# Patient Record
Sex: MALE | Race: WHITE | NOT HISPANIC OR LATINO | Employment: PART TIME | URBAN - METROPOLITAN AREA
[De-identification: names, ages, dates, MRNs, and addresses within clinical notes are randomized per-mention and may not be internally consistent; named-entity substitution may affect disease eponyms.]

---

## 2017-01-12 ENCOUNTER — AMBULATORY - HEALTHEAST (OUTPATIENT)
Dept: CARDIOLOGY | Facility: CLINIC | Age: 57
End: 2017-01-12

## 2017-01-12 DIAGNOSIS — R94.31 PROLONGED QT INTERVAL: ICD-10-CM

## 2017-01-13 ENCOUNTER — HOSPITAL ENCOUNTER (OUTPATIENT)
Dept: CARDIOLOGY | Facility: CLINIC | Age: 57
Discharge: HOME | End: 2017-01-13

## 2017-01-13 DIAGNOSIS — R94.31 PROLONGED QT INTERVAL: ICD-10-CM

## 2017-01-13 LAB
ATRIAL RATE - MUSE: 67 BPM
DIASTOLIC BLOOD PRESSURE - MUSE: NORMAL MMHG
INTERPRETATION ECG - MUSE: NORMAL
P AXIS - MUSE: 49 DEGREES
PR INTERVAL - MUSE: 130 MS
QRS DURATION - MUSE: 80 MS
QT - MUSE: 408 MS
QTC - MUSE: 431 MS
R AXIS - MUSE: 9 DEGREES
SYSTOLIC BLOOD PRESSURE - MUSE: NORMAL MMHG
T AXIS - MUSE: 56 DEGREES
VENTRICULAR RATE- MUSE: 67 BPM

## 2017-01-23 ENCOUNTER — AMBULATORY - HEALTHEAST (OUTPATIENT)
Dept: LAB | Facility: CLINIC | Age: 57
End: 2017-01-23

## 2017-01-23 DIAGNOSIS — Z79.899 ENCOUNTER FOR LONG-TERM (CURRENT) USE OF OTHER MEDICATIONS: ICD-10-CM

## 2017-01-23 LAB
CHOLEST SERPL-MCNC: 179 MG/DL
FASTING STATUS PATIENT QL REPORTED: ABNORMAL
HBA1C MFR BLD: 5.1 % (ref 4.2–6.1)
HDLC SERPL-MCNC: 41 MG/DL
LDLC SERPL CALC-MCNC: 69 MG/DL
TRIGL SERPL-MCNC: 346 MG/DL

## 2017-01-27 LAB
MISCELLANEOUS TEST DEPT. - HE HISTORICAL: NORMAL
PERFORMING LAB: NORMAL
SPECIMEN STATUS: NORMAL
TEST NAME: NORMAL

## 2018-01-11 ENCOUNTER — RECORDS - HEALTHEAST (OUTPATIENT)
Dept: LAB | Facility: CLINIC | Age: 58
End: 2018-01-11

## 2018-01-11 LAB
CHOLEST SERPL-MCNC: 174 MG/DL
FASTING STATUS PATIENT QL REPORTED: ABNORMAL
HDLC SERPL-MCNC: 50 MG/DL
LDLC SERPL CALC-MCNC: 85 MG/DL
TRIGL SERPL-MCNC: 194 MG/DL

## 2018-02-20 ENCOUNTER — AMBULATORY - HEALTHEAST (OUTPATIENT)
Dept: CARDIOLOGY | Facility: CLINIC | Age: 58
End: 2018-02-20

## 2018-02-20 ENCOUNTER — RECORDS - HEALTHEAST (OUTPATIENT)
Dept: ADMINISTRATIVE | Facility: OTHER | Age: 58
End: 2018-02-20

## 2018-02-21 ENCOUNTER — OFFICE VISIT - HEALTHEAST (OUTPATIENT)
Dept: CARDIOLOGY | Facility: CLINIC | Age: 58
End: 2018-02-21

## 2018-02-21 DIAGNOSIS — R07.9 CHEST PAIN, UNSPECIFIED TYPE: ICD-10-CM

## 2018-02-21 DIAGNOSIS — I10 ESSENTIAL HYPERTENSION: ICD-10-CM

## 2018-02-21 RX ORDER — DIPHENHYDRAMINE HCL 25 MG
CAPSULE ORAL
Status: SHIPPED | COMMUNITY
Start: 2016-07-07 | End: 2024-02-02

## 2018-02-21 RX ORDER — PILOCARPINE HYDROCHLORIDE 5 MG/1
TABLET, FILM COATED ORAL
Refills: 0 | Status: SHIPPED | COMMUNITY
Start: 2018-02-15 | End: 2024-02-02

## 2018-02-21 RX ORDER — CARIPRAZINE 3 MG/1
CAPSULE, GELATIN COATED ORAL
Refills: 6 | Status: SHIPPED | COMMUNITY
Start: 2018-01-09 | End: 2024-02-02

## 2018-02-21 RX ORDER — TRAZODONE HYDROCHLORIDE 100 MG/1
TABLET ORAL
Status: SHIPPED | COMMUNITY
Start: 2016-07-07 | End: 2024-02-02

## 2018-02-21 RX ORDER — BACLOFEN 20 MG/1
TABLET ORAL
Refills: 6 | Status: SHIPPED | COMMUNITY
Start: 2018-02-02

## 2018-02-21 RX ORDER — SILDENAFIL 25 MG/1
25 TABLET, FILM COATED ORAL DAILY PRN
Status: SHIPPED | COMMUNITY
Start: 2018-02-21

## 2018-02-21 RX ORDER — BUSPIRONE HYDROCHLORIDE 30 MG/1
TABLET ORAL
Status: SHIPPED | COMMUNITY
Start: 2018-02-21

## 2018-02-21 RX ORDER — LURASIDONE HYDROCHLORIDE 80 MG/1
TABLET, FILM COATED ORAL
Refills: 4 | Status: SHIPPED | COMMUNITY
Start: 2018-02-14 | End: 2024-02-02

## 2018-02-21 ASSESSMENT — MIFFLIN-ST. JEOR: SCORE: 1658.56

## 2018-04-13 ENCOUNTER — HOSPITAL ENCOUNTER (OUTPATIENT)
Dept: CARDIOLOGY | Facility: CLINIC | Age: 58
Discharge: HOME | End: 2018-04-13
Attending: INTERNAL MEDICINE

## 2018-04-13 LAB
CV STRESS CURRENT BP HE: NORMAL
CV STRESS CURRENT HR HE: 102
CV STRESS CURRENT HR HE: 104
CV STRESS CURRENT HR HE: 109
CV STRESS CURRENT HR HE: 113
CV STRESS CURRENT HR HE: 121
CV STRESS CURRENT HR HE: 121
CV STRESS CURRENT HR HE: 56
CV STRESS CURRENT HR HE: 60
CV STRESS CURRENT HR HE: 60
CV STRESS CURRENT HR HE: 61
CV STRESS CURRENT HR HE: 63
CV STRESS CURRENT HR HE: 66
CV STRESS CURRENT HR HE: 68
CV STRESS CURRENT HR HE: 69
CV STRESS CURRENT HR HE: 70
CV STRESS CURRENT HR HE: 74
CV STRESS CURRENT HR HE: 81
CV STRESS CURRENT HR HE: 88
CV STRESS CURRENT HR HE: 91
CV STRESS CURRENT HR HE: 92
CV STRESS CURRENT HR HE: 93
CV STRESS CURRENT HR HE: 94
CV STRESS CURRENT HR HE: 95
CV STRESS CURRENT HR HE: 97
CV STRESS CURRENT HR HE: 98
CV STRESS DEVIATION TIME HE: NORMAL
CV STRESS ECHO PERCENT HR HE: NORMAL
CV STRESS EXERCISE STAGE HE: NORMAL
CV STRESS FINAL RESTING BP HE: NORMAL
CV STRESS FINAL RESTING HR HE: 70
CV STRESS MAX HR HE: 122
CV STRESS MAX TREADMILL GRADE HE: 16
CV STRESS MAX TREADMILL SPEED HE: 4.2
CV STRESS PEAK DIA BP HE: NORMAL
CV STRESS PEAK SYS BP HE: NORMAL
CV STRESS PHASE HE: NORMAL
CV STRESS PROTOCOL HE: NORMAL
CV STRESS RESTING PT POSITION HE: NORMAL
CV STRESS ST DEVIATION AMOUNT HE: NORMAL
CV STRESS ST DEVIATION ELEVATION HE: NORMAL
CV STRESS ST EVELATION AMOUNT HE: NORMAL
CV STRESS TEST TYPE HE: NORMAL
CV STRESS TOTAL STAGE TIME MIN 1 HE: NORMAL
ECHO EJECTION FRACTION ESTIMATED: 60 %
STRESS ECHO BASELINE BP: NORMAL
STRESS ECHO BASELINE HR: 75
STRESS ECHO CALCULATED PERCENT HR: 76 %
STRESS ECHO LAST STRESS BP: NORMAL
STRESS ECHO LAST STRESS HR: 121
STRESS ECHO POST ESTIMATED WORKLOAD: 11.7
STRESS ECHO POST EXERCISE DUR MIN: 10
STRESS ECHO POST EXERCISE DUR SEC: 0
STRESS ECHO TARGET HR: 137

## 2018-05-31 ENCOUNTER — OFFICE VISIT - HEALTHEAST (OUTPATIENT)
Dept: INTERNAL MEDICINE | Facility: CLINIC | Age: 58
End: 2018-05-31

## 2018-05-31 DIAGNOSIS — G56.32 RADIAL NERVE PALSY, LEFT: ICD-10-CM

## 2018-05-31 ASSESSMENT — MIFFLIN-ST. JEOR: SCORE: 1672.62

## 2018-06-14 ENCOUNTER — COMMUNICATION - HEALTHEAST (OUTPATIENT)
Dept: INTERNAL MEDICINE | Facility: CLINIC | Age: 58
End: 2018-06-14

## 2018-06-14 ENCOUNTER — OFFICE VISIT - HEALTHEAST (OUTPATIENT)
Dept: INTERNAL MEDICINE | Facility: CLINIC | Age: 58
End: 2018-06-14

## 2018-06-14 DIAGNOSIS — F10.21 SEVERE ALCOHOL USE DISORDER, IN SUSTAINED REMISSION (H): ICD-10-CM

## 2018-06-14 DIAGNOSIS — I10 ESSENTIAL HYPERTENSION: ICD-10-CM

## 2018-06-14 DIAGNOSIS — Z12.11 SCREEN FOR COLON CANCER: ICD-10-CM

## 2018-06-14 DIAGNOSIS — K21.9 GASTROESOPHAGEAL REFLUX DISEASE WITHOUT ESOPHAGITIS: ICD-10-CM

## 2018-06-14 DIAGNOSIS — F25.1 SCHIZOAFFECTIVE DISORDER, DEPRESSIVE TYPE (H): ICD-10-CM

## 2018-06-14 DIAGNOSIS — B18.2 CHRONIC HEPATITIS C WITHOUT HEPATIC COMA (H): ICD-10-CM

## 2018-06-14 DIAGNOSIS — R29.90 STROKE-LIKE SYMPTOMS: ICD-10-CM

## 2018-06-14 DIAGNOSIS — R29.898 LEFT HAND WEAKNESS: ICD-10-CM

## 2018-06-14 ASSESSMENT — MIFFLIN-ST. JEOR: SCORE: 1659.01

## 2018-06-25 ENCOUNTER — HOSPITAL ENCOUNTER (OUTPATIENT)
Dept: MRI IMAGING | Facility: CLINIC | Age: 58
Discharge: HOME | End: 2018-06-25
Attending: INTERNAL MEDICINE

## 2018-06-25 DIAGNOSIS — R29.898 LEFT HAND WEAKNESS: ICD-10-CM

## 2018-06-25 DIAGNOSIS — R42 DIZZINESS AND GIDDINESS: ICD-10-CM

## 2018-06-25 DIAGNOSIS — R29.90 STROKE-LIKE SYMPTOMS: ICD-10-CM

## 2018-06-28 ENCOUNTER — OFFICE VISIT - HEALTHEAST (OUTPATIENT)
Dept: OCCUPATIONAL THERAPY | Facility: REHABILITATION | Age: 58
End: 2018-06-28

## 2018-06-28 DIAGNOSIS — R29.898 WRIST WEAKNESS: ICD-10-CM

## 2018-06-28 DIAGNOSIS — M25.639 DECREASED RANGE OF MOTION OF WRIST: ICD-10-CM

## 2018-06-28 DIAGNOSIS — Z78.9 DECREASED ACTIVITIES OF DAILY LIVING (ADL): ICD-10-CM

## 2018-07-05 ENCOUNTER — HOSPITAL ENCOUNTER (OUTPATIENT)
Dept: PHYSICAL MEDICINE AND REHAB | Facility: CLINIC | Age: 58
Discharge: HOME | End: 2018-07-05
Attending: INTERNAL MEDICINE

## 2018-07-05 DIAGNOSIS — R29.898 LEFT HAND WEAKNESS: ICD-10-CM

## 2018-07-09 ENCOUNTER — COMMUNICATION - HEALTHEAST (OUTPATIENT)
Dept: INTERNAL MEDICINE | Facility: CLINIC | Age: 58
End: 2018-07-09

## 2018-07-13 ENCOUNTER — OFFICE VISIT - HEALTHEAST (OUTPATIENT)
Dept: INTERNAL MEDICINE | Facility: CLINIC | Age: 58
End: 2018-07-13

## 2018-07-13 DIAGNOSIS — G62.9 NEUROPATHY: ICD-10-CM

## 2018-07-13 ASSESSMENT — MIFFLIN-ST. JEOR: SCORE: 1654.48

## 2018-07-17 ENCOUNTER — OFFICE VISIT - HEALTHEAST (OUTPATIENT)
Dept: OCCUPATIONAL THERAPY | Facility: REHABILITATION | Age: 58
End: 2018-07-17

## 2018-07-17 DIAGNOSIS — R29.898 WRIST WEAKNESS: ICD-10-CM

## 2018-07-17 DIAGNOSIS — M25.639 DECREASED RANGE OF MOTION OF WRIST: ICD-10-CM

## 2018-07-17 DIAGNOSIS — Z78.9 DECREASED ACTIVITIES OF DAILY LIVING (ADL): ICD-10-CM

## 2018-10-30 ENCOUNTER — RECORDS - HEALTHEAST (OUTPATIENT)
Dept: ADMINISTRATIVE | Facility: OTHER | Age: 58
End: 2018-10-30

## 2018-11-05 ENCOUNTER — HOSPITAL ENCOUNTER (OUTPATIENT)
Dept: MRI IMAGING | Facility: CLINIC | Age: 58
Discharge: HOME | End: 2018-11-05

## 2018-11-05 DIAGNOSIS — R29.898 LEFT HAND WEAKNESS: ICD-10-CM

## 2019-01-21 ENCOUNTER — RECORDS - HEALTHEAST (OUTPATIENT)
Dept: LAB | Facility: CLINIC | Age: 59
End: 2019-01-21

## 2019-01-21 LAB
ANION GAP SERPL CALCULATED.3IONS-SCNC: 11 MMOL/L (ref 5–18)
BUN SERPL-MCNC: 15 MG/DL (ref 8–22)
CALCIUM SERPL-MCNC: 9.8 MG/DL (ref 8.5–10.5)
CHLORIDE BLD-SCNC: 105 MMOL/L (ref 98–107)
CO2 SERPL-SCNC: 25 MMOL/L (ref 22–31)
CREAT SERPL-MCNC: 1.15 MG/DL (ref 0.7–1.3)
GFR SERPL CREATININE-BSD FRML MDRD: >60 ML/MIN/1.73M2
GLUCOSE BLD-MCNC: 141 MG/DL (ref 70–125)
POTASSIUM BLD-SCNC: 4.2 MMOL/L (ref 3.5–5)
SODIUM SERPL-SCNC: 141 MMOL/L (ref 136–145)

## 2019-03-28 ENCOUNTER — RECORDS - HEALTHEAST (OUTPATIENT)
Dept: ADMINISTRATIVE | Facility: OTHER | Age: 59
End: 2019-03-28

## 2019-04-01 ENCOUNTER — AMBULATORY - HEALTHEAST (OUTPATIENT)
Dept: CARDIOLOGY | Facility: CLINIC | Age: 59
End: 2019-04-01

## 2019-04-01 ENCOUNTER — HOSPITAL ENCOUNTER (OUTPATIENT)
Dept: CARDIOLOGY | Facility: CLINIC | Age: 59
Discharge: HOME | End: 2019-04-01

## 2019-04-01 DIAGNOSIS — Z79.899 MEDICATION MANAGEMENT: ICD-10-CM

## 2019-04-01 LAB
ATRIAL RATE - MUSE: 67 BPM
DIASTOLIC BLOOD PRESSURE - MUSE: NORMAL MMHG
INTERPRETATION ECG - MUSE: NORMAL
P AXIS - MUSE: 60 DEGREES
PR INTERVAL - MUSE: 140 MS
QRS DURATION - MUSE: 90 MS
QT - MUSE: 408 MS
QTC - MUSE: 431 MS
R AXIS - MUSE: 25 DEGREES
SYSTOLIC BLOOD PRESSURE - MUSE: NORMAL MMHG
T AXIS - MUSE: 62 DEGREES
VENTRICULAR RATE- MUSE: 67 BPM

## 2020-03-25 ENCOUNTER — RECORDS - HEALTHEAST (OUTPATIENT)
Dept: ADMINISTRATIVE | Facility: OTHER | Age: 60
End: 2020-03-25

## 2020-03-25 LAB
LAB AP CHARGES (HE HISTORICAL CONVERSION): NORMAL
PATH REPORT.COMMENTS IMP SPEC: NORMAL
PATH REPORT.COMMENTS IMP SPEC: NORMAL
PATH REPORT.FINAL DX SPEC: NORMAL
PATH REPORT.GROSS SPEC: NORMAL
PATH REPORT.MICROSCOPIC SPEC OTHER STN: NORMAL
PATH REPORT.RELEVANT HX SPEC: NORMAL
RESULT FLAG (HE HISTORICAL CONVERSION): NORMAL

## 2020-04-16 ENCOUNTER — AMBULATORY - HEALTHEAST (OUTPATIENT)
Dept: LAB | Facility: CLINIC | Age: 60
End: 2020-04-16

## 2020-04-16 DIAGNOSIS — Z79.899 ENCOUNTER FOR LONG-TERM (CURRENT) USE OF MEDICATIONS: ICD-10-CM

## 2020-04-16 LAB
CHOLEST SERPL-MCNC: 154 MG/DL
ERYTHROCYTE [DISTWIDTH] IN BLOOD BY AUTOMATED COUNT: 13.2 % (ref 11–14.5)
FASTING STATUS PATIENT QL REPORTED: ABNORMAL
FASTING STATUS PATIENT QL REPORTED: NORMAL
GLUCOSE BLD-MCNC: 96 MG/DL (ref 70–125)
HBA1C MFR BLD: 5.4 %
HCT VFR BLD AUTO: 43.7 % (ref 40–54)
HDLC SERPL-MCNC: 53 MG/DL
HGB BLD-MCNC: 13.9 G/DL (ref 14–18)
LDLC SERPL CALC-MCNC: 68 MG/DL
MCH RBC QN AUTO: 30.5 PG (ref 27–34)
MCHC RBC AUTO-ENTMCNC: 31.8 G/DL (ref 32–36)
MCV RBC AUTO: 96 FL (ref 80–100)
PLATELET # BLD AUTO: 282 THOU/UL (ref 140–440)
PMV BLD AUTO: 9.7 FL (ref 8.5–12.5)
RBC # BLD AUTO: 4.56 MILL/UL (ref 4.4–6.2)
TRIGL SERPL-MCNC: 163 MG/DL
WBC: 7.8 THOU/UL (ref 4–11)

## 2020-04-24 ENCOUNTER — AMBULATORY - HEALTHEAST (OUTPATIENT)
Dept: CARDIOLOGY | Facility: CLINIC | Age: 60
End: 2020-04-24

## 2020-04-24 DIAGNOSIS — T50.905A MEDICATION REACTION: ICD-10-CM

## 2020-07-15 ENCOUNTER — RECORDS - HEALTHEAST (OUTPATIENT)
Dept: LAB | Facility: CLINIC | Age: 60
End: 2020-07-15

## 2020-07-15 LAB
ANION GAP SERPL CALCULATED.3IONS-SCNC: 12 MMOL/L (ref 5–18)
BUN SERPL-MCNC: 12 MG/DL (ref 8–22)
CALCIUM SERPL-MCNC: 9.3 MG/DL (ref 8.5–10.5)
CHLORIDE BLD-SCNC: 105 MMOL/L (ref 98–107)
CO2 SERPL-SCNC: 23 MMOL/L (ref 22–31)
CREAT SERPL-MCNC: 1.17 MG/DL (ref 0.7–1.3)
GFR SERPL CREATININE-BSD FRML MDRD: >60 ML/MIN/1.73M2
GLUCOSE BLD-MCNC: 77 MG/DL (ref 70–125)
POTASSIUM BLD-SCNC: 4.4 MMOL/L (ref 3.5–5)
PSA SERPL-MCNC: 0.2 NG/ML (ref 0–4.5)
SODIUM SERPL-SCNC: 140 MMOL/L (ref 136–145)

## 2021-01-11 ENCOUNTER — RECORDS - HEALTHEAST (OUTPATIENT)
Dept: LAB | Facility: CLINIC | Age: 61
End: 2021-01-11

## 2021-01-11 LAB
ANION GAP SERPL CALCULATED.3IONS-SCNC: 11 MMOL/L (ref 5–18)
BUN SERPL-MCNC: 9 MG/DL (ref 8–22)
CALCIUM SERPL-MCNC: 9.8 MG/DL (ref 8.5–10.5)
CHLORIDE BLD-SCNC: 102 MMOL/L (ref 98–107)
CO2 SERPL-SCNC: 25 MMOL/L (ref 22–31)
CREAT SERPL-MCNC: 0.99 MG/DL (ref 0.7–1.3)
GFR SERPL CREATININE-BSD FRML MDRD: >60 ML/MIN/1.73M2
GLUCOSE BLD-MCNC: 109 MG/DL (ref 70–125)
POTASSIUM BLD-SCNC: 4.5 MMOL/L (ref 3.5–5)
SODIUM SERPL-SCNC: 138 MMOL/L (ref 136–145)

## 2021-06-01 VITALS — WEIGHT: 188 LBS | BODY MASS INDEX: 26.92 KG/M2 | HEIGHT: 70 IN

## 2021-06-01 VITALS — WEIGHT: 187.9 LBS | HEIGHT: 70 IN | BODY MASS INDEX: 26.9 KG/M2

## 2021-06-01 VITALS — HEIGHT: 70 IN | BODY MASS INDEX: 27.35 KG/M2 | WEIGHT: 191 LBS

## 2021-06-01 VITALS — BODY MASS INDEX: 26.77 KG/M2 | WEIGHT: 187 LBS | HEIGHT: 70 IN

## 2021-06-16 PROBLEM — F10.21 SEVERE ALCOHOL USE DISORDER, IN SUSTAINED REMISSION (H): Status: ACTIVE | Noted: 2018-06-14

## 2021-06-16 PROBLEM — K21.9 GASTROESOPHAGEAL REFLUX DISEASE WITHOUT ESOPHAGITIS: Status: ACTIVE | Noted: 2018-06-14

## 2021-06-16 PROBLEM — B18.2 CHRONIC HEPATITIS C VIRUS INFECTION (H): Status: ACTIVE | Noted: 2018-06-14

## 2021-06-18 NOTE — PROGRESS NOTES
Office Visit - New Patient   Patricio Khan   57 y.o.  male    Date of visit: 6/14/2018  Physician: Edmund Bowden MD     Assessment and Plan   1. Left hand weakness  This is probably a peripheral neuropathy but he has risk factors for vascular disease and given ongoing weakness I would recommend an MRI to evaluate for a central process.  He is on aspirin.  Further workup evaluation depending on findings of MRI.  Assuming this is normal we will proceed with EMG and physical therapy consider neurology evaluation  - MR Brain COW Carotid With and Without Contrast; Future  - EMG- Left Arm; Future  - Ambulatory referral to Adult PT- Internal    2. Schizoaffective disorder, depressive type (H)  Follows with Dr. Sunday Singh, generally stable    3. Essential hypertension  Pressure controlled he is on propanolol    4. Severe alcohol use disorder, in sustained remission (H)  He takes baclofen for this, sober for a couple of years    5. Gastroesophageal reflux disease without esophagitis  He is on a PPI    6. Chronic hepatitis C without hepatic coma (H)  Status post treatment    7. Screen for colon cancer  He states he is up-to-date on his colon cancer screening    Return in about 1 week (around 6/21/2018) for recheck.     Chief Complaint   Chief Complaint   Patient presents with     Wrist Pain     unable to move left wrist        Patient Profile   Social History     Social History Narrative    Lives with his , Kd.  On SSDI, mental illness.  Previously worked as a .  Kd is on disability secondary to a stroke.         Past Medical History   Patient Active Problem List   Diagnosis     Chronic pain syndrome     Anxiety     Schizoaffective disorder, depressive type (H)     Essential hypertension     Severe alcohol use disorder, in sustained remission (H)     Gastroesophageal reflux disease without esophagitis     Hep C, s/p tx       Past Surgical History  He has a past surgical history that  includes [place] tooth extraction.     History of Present Illness   This 57 y.o. old man comes in for evaluation of left hand weakness.  About 2 weeks ago he woke up and he noticed that his hand was weak.  He could not extend his wrist or  with his fist.  He thought he had some numbness as well but this seems to have resolved.  He did not notice any other neurological deficits.  Specifically no facial droop no left leg weakness.  No speech difficulties.  No vision changes.  He has no history of stroke or heart attack.  He does have a history of smoking and hypertension.  His lipids have generally been okay.  Recent stress echocardiogram looked okay for evaluation of chest pain.  He does take an aspirin but is not on a statin.  He was seen by Dr. 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  It was recommended that he give this time and it will improve.  Certainly it has improved since he saw Dr. Ley but he still has weakness with extension of his wrist as well as  strength.    Review of Systems: A comprehensive review of systems was negative except as noted.     Medications and Allergies   Current Outpatient Prescriptions   Medication Sig Dispense Refill     aspirin 81 MG EC tablet Take 81 mg by mouth daily.       baclofen (LIORESAL) 20 MG tablet TK 2 TS PO QAM AND QHS  6     busPIRone (BUSPAR) 30 MG tablet 1 tab(s)       dicyclomine (BENTYL) 20 mg tablet Take 20 mg by mouth 2 (two) times a day.       diphenhydrAMINE (BENADRYL) 25 mg capsule 2 cap(s)       LATUDA 80 mg Tab TK 2 TS PO QAM WITH MORNING MEAL OF AT LEAST 500 CALORIES  4     multivitamin with minerals (THERA-M) 9 mg iron-400 mcg Tab tablet Take 1 tablet by mouth daily.       omeprazole (PRILOSEC) 40 MG capsule Take 1 capsule (40 mg total) by mouth 2 (two) times a day before meals. 60 capsule 0     pilocarpine (SALAGEN) 5 MG tablet TK ONE T PO WITH EVENING MEAL AND ONE T FOUR  HOURS LATER AT BEDTIME  0     propranolol (INDERAL LA) 120  "mg 24 hr capsule TK ONE C D  3     psyllium with dextrose (METAMUCIL JAR) powder Take by mouth 3 (three) times a day.       sildenafil (VIAGRA) 25 MG tablet Take 25 mg by mouth daily as needed for erectile dysfunction.       simethicone (MYLICON) 125 MG chewable tablet Chew 125 mg daily.        traZODone (DESYREL) 100 MG tablet 1 1/2   tab(s)       VRAYLAR 3 mg cap capsule TK ONE C PO QAM  6     No current facility-administered medications for this visit.      Allergies   Allergen Reactions     Chlorpromazine Shortness Of Breath     Acetaminophen Nausea Only     Bupropion Hcl      Unable to urinate     Fluphenazine Hcl      Unable to urinate     Latuda [Lurasidone]      EPSE   Primary MD directed pt. to avoid      Mellaril [Thioridazine] Other (See Comments)     Difficulty breathing and Unable to urinate     Nsaids (Non-Steroidal Anti-Inflammatory Drug) Other (See Comments)     Primary md directed patient to avoid     Olanzapine      Unable to urinate     Risperidone      Unable to urinate     Statins-Hmg-Coa Reductase Inhibitors      unknown        Family and Social History   Family History   Problem Relation Age of Onset     No Medical Problems Mother      Colon cancer Father      mets to liver     No Medical Problems Brother      No Medical Problems Sister      No Medical Problems Sister         Social History   Substance Use Topics     Smoking status: Former Smoker     Packs/day: 1.00     Years: 40.00     Types: Cigarettes     Quit date: 2/21/2015     Smokeless tobacco: Never Used     Alcohol use No      Comment: states no longer drink alcohol        Physical Exam   General Appearance:   No acute Distor    /80 (Patient Site: Left Arm, Patient Position: Sitting, Cuff Size: Adult Regular)  Pulse 63  Ht 5' 10\" (1.778 m)  Wt 188 lb (85.3 kg)  SpO2 97%  BMI 26.98 kg/m2    EYES: Eyelids, conjunctiva, and sclera were normal. Pupils were normal. Cornea, iris, and lens were normal bilaterally.  HEAD, EARS, " NOSE, MOUTH, AND THROAT: Head and face were normal. Hearing was normal to voice and the ears were normal to external exam. Nose appearance was normal and there was no discharge. Oropharynx was normal.  NECK: Neck appearance was normal. There were no neck masses and the thyroid was not enlarged.  RESPIRATORY: Breathing pattern was normal and the chest moved symmetrically.  Percussion/auscultatory percussion was normal.  Lung sounds were normal and there were no abnormal sounds.  CARDIOVASCULAR: Heart rate and rhythm were normal.  S1 and S2 were normal and there were no extra sounds or murmurs. Peripheral pulses in arms and legs were normal.  Jugular venous pressure was normal.  There was no peripheral edema.  GASTROINTESTINAL: The abdomen was normal in contour.  Bowel sounds were present.  Percussion detected no organ enlargement or tenderness.  Palpation detected no tenderness, mass, or enlarged organs.   MUSCULOSKELETAL: Skeletal configuration was normal and muscle mass was normal for age. Joint appearance was overall normal.  LYMPHATIC: There were no enlarged nodes.  SKIN/HAIR/NAILS: Skin color was normal.  There were no skin lesions.  Hair and nails were normal.  NEUROLOGIC: He does have weakness with extension of his wrist, he has strength antigravity but no strength with any resistance.  He has diminished  strength.  He has atrophy of his intrinsic hand muscles.  He has fairly intact strength with testing of his triceps and biceps.  I was not able to notice any difference in the rest of his brachial radialis muscles.  His reflexes are symmetric bilaterally.  He has no lower extremity weakness and symmetric reflexes.  Sensory exam is normal.  He has a somewhat flat facies but I am unable to appreciate any significant facial droop.  Speech is normal.  He is able to name objects and repeat sentences.  He has a slight tremor of his left arm which he states is related to medications and is not new.  Gait is  normal.  PSYCHIATRIC:  Mood and affect were normal and the patient had normal recent and remote memory. The patient's judgment and insight were normal.       Additional Information   Review and/or order of clinical lab tests: Reviewed  Review and/or order of radiology tests: Ordered, including CT of the head and neck and MRI of the head back in 2014 generally unremarkable, some small vessel ischemic disease on the MRI  Review and/or order of medicine tests: Reviewed  Discussion of test results with performing physician:  Decision to obtain old records and/or obtain history from someone other than the patient:  Review and summarization of old records and/or obtaining history from someone other than the patient and.or discussion of case with another health care provider: I reviewed records from Dr. Kuldip Ley as well as Dr. Bhavik Wang  Independent visualization of image, tracing or specimen itself:    Time:      Edmund Bowden MD  Internal Medicine  Contact me at [phone]

## 2021-06-18 NOTE — PROGRESS NOTES
1. Radial nerve palsy, left        Plan: I educated him on what this condition is, and its transient nature.  I told him that he should try to find a different sleeping position, and be aware of what causes and makes this worse.  He can also get a wrist splint that could keep his hand in a bit more of an extension mode to make it more functional.  I told him this should resolve on its own in a few weeks possibly.  If it still is persisting in 2 or 3 weeks he could come back here and be re-seen again, where we could talk about doing possibly some physical therapy and/or some nerve testing such as with an EMG to look for any other causes this might be but I believe that this will be a transient condition that should resolve on its own.    Subjective: 57-year-old male who is here today for troubles with his left hand.  He states that about 5 days ago he woke up and his left hand could not be extended that it was he was unable to lift it up.  He does not know of any trauma to the arm or hand or forearm.  He has not had this condition before.  He sleeps a lot with his left arm under his body, and he noted this happened right away after he woke up 5 days ago.  It is not gotten any worse but also has not gotten better.  He states that there was some tingling over the dorsum of the left hand but that has resolved.  He feels a  strength loss but really the main thing is that he can extend his wrist at all.    Objective: Well-appearing male in no acute distress.  Vital signs as noted.  Chest clear to auscultation.  Heart regular rate and rhythm.  Shoulder examination show good strength.  He has normal biceps strength bilaterally.  His triceps strength is preserved on that left side, equal to that on the right.  He has very little ability to extend his wrist at all.  He has also weakness in the intrinsic muscles of the hand.  His  is minimally affected when his hand is actively placed into extension.  His sensation  seems to be preserved.  He has a negative Tinel's sign at his median nerve and his ulnar nerve.  Brachioradialis strength is also diminished on the left side compared to the right.

## 2021-06-19 NOTE — PROGRESS NOTES
"  Office Visit - Follow Up   Patricio Khan   57 y.o. male    Date of Visit: 7/13/2018    Chief Complaint   Patient presents with     Hand Injury     left follow up        Assessment and Plan   1. Neuropathy (H)  EMG suggestive of brachial plexopathy.  I recommended that we get an MRI of the brachial plexus and he declined due to cost.  I think he should see a neurologist for further recommendation  - Ambulatory referral to Neurology    Return in about 4 weeks (around 8/10/2018) for recheck.     History of Present Illness   This 57 y.o. old man comes in for evaluation of left arm weakness.  Last visit we obtain an MRI MRA of the head and neck which did not show any evidence of a stroke.  He had an EMG which showed abnormality suggestive of brachial plexopathy.  He has been going to hand therapy and improving strength in his hand and arm.  He still has weakness in his hand and numbness.    Review of Systems: A comprehensive review of systems was negative except as noted.     Medications, Allergies and Problem List   Reviewed and updated     Physical Exam   General Appearance:   No acute distress    /60 (Patient Site: Left Arm, Patient Position: Sitting, Cuff Size: Adult Regular)  Pulse 68  Ht 5' 10\" (1.778 m)  Wt 187 lb (84.8 kg)  SpO2 98%  BMI 26.83 kg/m2    He has atrophy of his thenar muscle of the left hand and decreased strength of the interosseous muscles.  Improve strength with flexion extension of the wrist and at the elbow.     Additional Information   Current Outpatient Prescriptions   Medication Sig Dispense Refill     aspirin 81 MG EC tablet Take 81 mg by mouth daily.       baclofen (LIORESAL) 20 MG tablet TK 2 TS PO QAM AND QHS  6     busPIRone (BUSPAR) 30 MG tablet 1 tab(s)       dicyclomine (BENTYL) 20 mg tablet Take 20 mg by mouth 2 (two) times a day.       diphenhydrAMINE (BENADRYL) 25 mg capsule 2 cap(s)       LATUDA 80 mg Tab TK 2 TS PO QAM WITH MORNING MEAL OF AT LEAST 500 CALORIES  4 "     multivitamin with minerals (THERA-M) 9 mg iron-400 mcg Tab tablet Take 1 tablet by mouth daily.       omeprazole (PRILOSEC) 40 MG capsule Take 1 capsule (40 mg total) by mouth 2 (two) times a day before meals. 60 capsule 0     pilocarpine (SALAGEN) 5 MG tablet TK ONE T PO WITH EVENING MEAL AND ONE T FOUR  HOURS LATER AT BEDTIME  0     propranolol (INDERAL LA) 120 mg 24 hr capsule TK ONE C D  3     psyllium with dextrose (METAMUCIL JAR) powder Take by mouth 3 (three) times a day.       sildenafil (VIAGRA) 25 MG tablet Take 25 mg by mouth daily as needed for erectile dysfunction.       simethicone (MYLICON) 125 MG chewable tablet Chew 125 mg daily.        traZODone (DESYREL) 100 MG tablet 1 1/2   tab(s)       VRAYLAR 3 mg cap capsule TK ONE C PO QAM  6     No current facility-administered medications for this visit.      Allergies   Allergen Reactions     Chlorpromazine Shortness Of Breath     Acetaminophen Nausea Only     Bupropion Hcl      Unable to urinate     Fluphenazine Hcl      Unable to urinate     Latuda [Lurasidone]      EPSE   Primary MD directed pt. to avoid      Mellaril [Thioridazine] Other (See Comments)     Difficulty breathing and Unable to urinate     Nsaids (Non-Steroidal Anti-Inflammatory Drug) Other (See Comments)     Primary md directed patient to avoid     Olanzapine      Unable to urinate     Risperidone      Unable to urinate     Statins-Hmg-Coa Reductase Inhibitors      unknown     Social History   Substance Use Topics     Smoking status: Former Smoker     Packs/day: 1.00     Years: 40.00     Types: Cigarettes     Quit date: 2/21/2015     Smokeless tobacco: Never Used     Alcohol use No      Comment: states no longer drink alcohol       Review and/or order of clinical lab tests:  Review and/or order of radiology tests:  Review and/or order of medicine tests:  Discussion of test results with performing physician:  Decision to obtain old records and/or obtain history from someone other than  the patient:  Review and summarization of old records and/or obtaining history from someone other than the patient and.or discussion of case with another health care provider:  Independent visualization of image, tracing or specimen itself:    Time:      Edmund Bowden MD

## 2021-06-19 NOTE — PROGRESS NOTES
[facility] Daily Progress     Patient Name: Patricio Khan  Date: 7/17/2018  Visit #: 2  Referral Diagnosis: left hand weakness  Referring provider: Megan Espinoza MD  Visit Diagnosis:     ICD-10-CM    1. Wrist weakness R29.898    2. Decreased range of motion of wrist M25.639    3. Decreased activities of daily living (ADL) Z78.9        Assessment:     Patient is appropriate to continue with skilled occupational therapy intervention, as indicated by initial plan of care. Patient had an EMG and an MRI that suggested a brachial plexopathy on the left arm.    Goal Status:  Patient will be independent with home exercise program in: 4 weeks  Patient will be able to: lift;carry;reach;for housework;for dressing;for grooming/hygiene;for grocery shopping;with no difficulty;in 12 weeks  Patient will be able to  & pinch: for dressing;for hygiene;for meal prep;for grooming;for household chores;with less difficulty;in 12 weeks  Patient will improve hand/finger coordination for: fasteners;typing;meal prep;dressing;for grooming/hygiene;with less difficulty;in 12 weeks    Plan / Patient Education:     Continue with initial plan of care.  Progress with home program as tolerated.    Subjective:     Pain rating at rest: 0  Pain rating with activity: 0      Objective:     Treatment Today: Reviewed , pinch and finger extensor strength as well as strengthening to left wrist flexors, extensors, pronators and supinators. Reviewed coordination exercises as well. Focused on refining HEP and avoiding substitutions. Added wrist extension with fingers fully extended and adducted. Added strengthening for finger adduction/abduction.   TREATMENT MINUTES COMMENTS   Evaluation     Self-care/ Home management     Manual therapy     Neuromuscular Re-education 26    Therapeutic Exercises     Iontophoresis     Orthotic Fitting     Total 26    Blank areas are intentional and mean the treatment did not include these items.        Tiffanie Murrieta  7/17/2018  9:56-10:22 AM

## 2021-06-19 NOTE — PROGRESS NOTES
Patient presents at the request of Dr. Bowden for left upper extremity EMG.  He has had approximately 5 week history of left wrist extension weakness after falling asleep on a flexed left wrist under his body for several hours.  He is right-handed.  On exam he has atrophy of intrinsic hand muscles on the left which she states is chronic.    EMG/NCS  results: Please see scanned full report    Comment NCS: Abnormal study  1.  Low amplitude diffusely left upper extremity sensory studies.  2.  Absent left ulnar sensory study and ulnar transcarpal study.  3.  Borderline bilateral CMAP conduction velocities in the right ulnar motor and left median studies.  4.  Prolonged distal latency left ulnar motor to the ADM with no amplitude drop or focal slowing across the elbow.  5.  Left median motor to the second lumbrical and ulnar motor to the palmar interosseous shows prolonged palmar interosseous latency with decreased amplitude.  6.  Borderline right radial SNAP amplitude.    Comment EMG: Abnormal study  1.  Active denervation potentials of the left first dorsal interosseous.  2.  Large motor unit potentials with reduced recruitment of the left FDI and APB.  Remainder left upper extremity normal.    Interpretation: Abnormal study    1.  Left ulnar neuropathy localizing to the wrist based on median ulnar motor comparison to the intrinsic hand muscles and prolonged distal latency of the left ulnar CMAP to the ADM.      2.  Active denervation of the left first dorsal interosseous muscle but also chronic denervation changes of the first dorsal interosseous and APB.  Given the diffuse sensory nerve changes in the left upper extremity, this could be related to a left brachial plexopathy most significantly affecting the lower trunk.  Concomitant C8-T1 radiculopathy cannot completely be excluded.    Note: Given the diffuse abnormalities in EMG of the left upper extremity, further imaging such as MRI of the brachial plexus and  cervical spine can be considered if clinically indicated.    The testing was completed in its entirety by the physician.       It was our pleasure caring for your patient today, if there any questions or concerns please do not hesitate to contact us.

## 2021-06-19 NOTE — PROGRESS NOTES
[facility] Certification Request    June 28, 2018      Patient: Patricio Khan  MR Number: [id number]  YOB: 1960  Date of Visit: 6/28/2018      Dear Dr. Megan Espinoza:    Thank you for this referral.   We are seeing Patricio Khan in Occupational Therapy for left hand weakness.    Medicare and/or Medicaid requires physician review and approval of the treatment plan. Please review the plan of care and verify that you agree with the therapy plan of care by co-signing this note.      Plan of Care  Authorization / Certification Start Date: 06/28/18  Authorization / Certification End Date: 09/26/18  Authorization / Certification Number of Visits: 12  Communication with: Referral Source  Patient Related Instruction: Nature of Condition;Treatment plan and rationale;Basis of treatment;Expected outcome  Times per Week: 1  Number of Weeks: 12  Number of Visits: 12  Therapeutic Exercise: Strengthening;ROM  Neuromuscular Reeducation: kinesio tape  Functional Training (ADL's): meal prep;self care;ADL's  Orthotic Fitting: OTC    Goals:  Patient will be independent with home exercise program in: 4 weeks  Patient will be able to: lift;carry;reach;for housework;for dressing;for grooming/hygiene;for grocery shopping;with no difficulty;in 12 weeks  Patient will be able to  & pinch: for dressing;for hygiene;for meal prep;for grooming;for household chores;with less difficulty;in 12 weeks  Patient will improve hand/finger coordination for: fasteners;typing;meal prep;dressing;for grooming/hygiene;with less difficulty;in 12 weeks      If you have any questions or concerns, please don't hesitate to call.    Sincerely,      Tiffanie Murrieta, OT        Physician recommendation:     ___ Follow therapist's recommendation        ___ Modify therapy      *Physician co-signature indicates they certify the need for these services furnished within this plan and while under their care.      [facility]    Upper Extremity Initial Evaluation    Patient Name: Patricio Khan  Date of evaluation: 6/28/2018  Referral Diagnosis: left hand weakness  Referring provider: Megan Espinoza MD  Visit Diagnosis:     ICD-10-CM    1. Wrist weakness R29.898    2. Decreased range of motion of wrist M25.639    3. Decreased activities of daily living (ADL) Z78.9        Assessment:      Pt. is appropriate for skilled OT intervention as outlined in the Plan of Care (POC).    Goals:  Patient will be independent with home exercise program in: 4 weeks  Patient will be able to: lift;carry;reach;for housework;for dressing;for grooming/hygiene;for grocery shopping;with no difficulty;in 12 weeks  Patient will be able to  & pinch: for dressing;for hygiene;for meal prep;for grooming;for household chores;with less difficulty;in 12 weeks  Patient will improve hand/finger coordination for: fasteners;typing;meal prep;dressing;for grooming/hygiene;with less difficulty;in 12 weeks    Patient's expectations/goals are realistic.    Barriers to Learning or Achieving Goals:  No Barriers.       Plan / Patient Instructions:        Plan of Care:   Authorization / Certification Start Date: 06/28/18  Authorization / Certification End Date: 09/26/18  Authorization / Certification Number of Visits: 12  Communication with: Referral Source  Patient Related Instruction: Nature of Condition;Treatment plan and rationale;Basis of treatment;Expected outcome  Times per Week: 1  Number of Weeks: 12  Number of Visits: 12  Therapeutic Exercise: Strengthening;ROM  Neuromuscular Reeducation: kinesio tape  Functional Training (ADL's): meal prep;self care;ADL's  Orthotic Fitting: OTC    Plan for next visit: re-measure and progress HEP     Subjective:        Social information:   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 male who presents to therapy today with complaints of left hand  weakness and numbness. Date of onset/duration of symptoms is May31st. Onset was sudden. Symptoms are getting better. He reports no history of similar symptoms. He describes their previous level of function as not limited. Functional limitations are described as occurring with gripping, pinching, lifting, carrying for ADL's and IADL's.    Pain rating at rest: 0  Pain rating with activity: 0  Pain description: N/A         Objective:      Note: Items left blank indicates the item was not performed or not indicated at the time of the evaluation.    Patient Outcome Measures :    QuickDASH Score: 38.6    Upper Extremity Examination:  1. Wrist weakness     2. Decreased range of motion of wrist     3. Decreased activities of daily living (ADL)       Precautions/Restrictions: None  Involved side: Left  Atrophy:  Absent  Color: Normal  Temperature: Normal  Edema: Absent  Palpation: No tenderness.   Scar: NA  Guarded extremity: Moderate.  Sensory: Patient reports abnormal  tingling      Coordination  Right   Left     NT WNL Impaired NT WNL Impaired   Gross Motor  x    x   Fine Motor  x    x   9 hole peg x   x       Hand AROM  Right   Left     NT WNL Impaired NT WNL Impaired   Full Fist  x   x    Flat Fist  x   x    Claw Fist  x   x      ROM/STRENGTH RIGHT LEFT   Note: * indicates pain AROM AROM   Shoulder Flexion - 180? WNL WNL   Shoulder Ext - 60?  WNL WNL   Shoulder Abd - 180? WNL WNL   Elbow Flexion - 150? WNL WNL   Elbow Extension - 0?    WNL WNL   Forearm Supination - 80? WNL WNL   Forearm Pronation - 80? WNL WNL   Wrist Flexion - 65-80?  WNL WNL   Wrist Extension - 65-80? 72 45         strength 99# 16#   3 Point Pinch 16# 2#    18# 5#          May benefit from PT evaluation: No    U/E orthosis currently: not wearing the brace but I recommended he wear it at night.    Treatment Today:  Patient instructed on , pinch and finger extensor strength as well as strengthening to left wrist flexors, extensors, pronators  and supinators. Instructed on coordination exercises as well.  TREATMENT MINUTES COMMENTS   Evaluation 20    Self-care/ Home management     Manual therapy     Neuromuscular Re-education 27    Orthotic fitting     Therapeutic Exercises     Iontophoresis           Total 47    Blank areas are intentional and mean the treatment did not include these items.     GOALS AND PLAN OF CARE WERE ESTABLISHED IN COOPERATION WITH THE PATIENT    OT Evaluation Code: (Please list factors)   Comorbidities: schizoaffective disorder, anxiety, HTN, Hepatitis C  Profile/History Review: Brief    Need for eval modification: No    # Treatment options: Limited    Clinical Decision Making:  Low      Occupational Profile/ Medical and Therapy History and Comorbidities Occupational Performance Clinical Decision Making   (Complexity)   brief history with review of medical/therapy records related to the presenting problem.  No comorbidities 1-3 Performance deficits that result in activity limitations and/or participation restrictions.    No Assessment Modification  Low complexity, which includes  problem-focused assessments, and consideration of a limited number of treatment options.      expanded review of medical/therapy records and additional review of physical, cognitive and psychosocial history.    May have comorbidities 3-5 Performance deficits that result in activity limitations and/or participation restrictions.    Minimal to moderate modification of assessment Moderate complexity, which includes analysis of data from detailed assessments, and consideration of several treatment options.         Review of medical/therapy records and extensive additional review of physical, cognitive and psychosocial history.  Comorbidities affect occupational performance 5 or more Performance deficits that result in activity limitations and/or participation restrictions.    Significant modification of assessment High complexity, analysis of  Occupational  profile and data,  Comprehensive assessments, multiple treatment options.            Tiffanie Murrieta  6/28/2018  1:25-2:12 PM

## 2021-06-20 NOTE — PROGRESS NOTES
[facility] Discharge Summary  Patient Name: Patricio Khan  Date: 10/5/2018  Referral Diagnosis: left hand weakness  Referring provider: Megan Espinoza MD  Visit Diagnosis:   1. Wrist weakness     2. Decreased range of motion of wrist     3. Decreased activities of daily living (ADL)         Goal status: progressing toward    Patient was seen for 2 visits between 6-28-18 and 7-17-18.    Therapy will be discontinued at this time.  The patient will need a new referral to resume.    Thank you for your referral.  Tiffanie Murrieta  10/5/2018  10:42 AM

## 2021-06-26 NOTE — PROGRESS NOTES
Progress Notes by Bhavik Wang MD at 2/21/2018 10:10 AM     Author: Bhavik Wang MD Service: -- Author Type: Physician    Filed: 2/21/2018 10:41 AM Encounter Date: 2/21/2018 Status: Signed    : Bhavik Wang MD (Physician)           Click to link to [facility] Consultation Note    Thank you, Dr. Megan Espinoza, for asking Patricio Khan to meet with me in consultation today at the [facility] to evaluate chest discomfort.     Assessment:    1. Chest pain, unspecified type  Echo Stress Exercise   2. Essential hypertension         Plan:    1.  We will call Armani with the results of his exercise echocardiographic stress test.    An After Visit Summary was printed and given to the patient.    Current History:    Armani states he has had intermittent, unpredictable left-sided chest discomfort for approximately 2 years.  The episodes typically happen in the evening and start in the left lower anterior chest and radiated towards the left upper chest and shoulder.  There are no associated symptoms such as nausea, diaphoresis, or dyspnea.  The episodes last for up to 2 hours.  He had more frequent episodes in the last 2 months so he discuss this with his primary care physician.  A consultation in our clinic was arranged.  He states he has not had any episodes since the visit with his primary care physician.    His family history is negative for premature cardiovascular disease.  He quit smoking a few years ago.  He does not have diabetes nor is he treated for dyslipidemia.  He is on propranolol for essential hypertension.    Patient Active Problem List   Diagnosis   ? Chronic pain syndrome   ? Anxiety disorder, unspecified anxiety disorder type   ? Schizoaffective disorder, depressive type   ? Essential hypertension       Past Medical History:  Past Medical History:   Diagnosis Date   ? ADD (attention deficit disorder)     ADHD   ? Anxiety    ?  Chemical dependency     in recovery since 2011; used alcohol and marijuana and amphetamines   ? Depression    ? Essential hypertension    ? GERD (gastroesophageal reflux disease)    ? Hepatitis C     treated with Harvoni in 2016 and states he is cured   ? IBS (irritable bowel syndrome)    ? Neuroleptic consent form discussed and signed: June 4, 2016 6/4/2016     Replacement Utility updated for latest IMO load   ? Psychotic disorder     auditory hallucinations   ? Schizoaffective disorder    ? Urine retention        Past Surgical History:  Past Surgical History:   Procedure Laterality Date   ? WISDOM TOOTH EXTRACTION      3 oral surgeries       Family History:  Family History   Problem Relation Age of Onset   ? No Medical Problems Mother    ? Colon cancer Father      mets to liver   ? No Medical Problems Brother    ? No Medical Problems Sister    ? No Medical Problems Sister        Social History:   reports that he quit smoking about 3 years ago. His smoking use included Cigarettes. He has a 40.00 pack-year smoking history. He has never used smokeless tobacco. He reports that he drinks about 8.4 oz of alcohol per week  He reports that he does not use illicit drugs.    Medications:  Outpatient Encounter Prescriptions as of 2/21/2018   Medication Sig Dispense Refill   ? baclofen (LIORESAL) 20 MG tablet TK 2 TS PO QAM AND QHS  6   ? busPIRone (BUSPAR) 30 MG tablet 1 tab(s)     ? dicyclomine (BENTYL) 20 mg tablet Take 20 mg by mouth 2 (two) times a day.     ? diphenhydrAMINE (BENADRYL) 25 mg capsule 2 cap(s)     ? esomeprazole (NEXIUM) 40 MG capsule Take 40 mg by mouth 2 (two) times a day before meals.      ? LATUDA 80 mg Tab TK 2 TS PO QAM WITH MORNING MEAL OF AT LEAST 500 CALORIES  4   ? linaclotide (LINZESS) 145 mcg cap capsule Take 145 mcg by mouth daily as needed.     ? multivitamin with minerals (THERA-M) 9 mg iron-400 mcg Tab tablet Take 1 tablet by mouth daily.     ? pilocarpine (SALAGEN) 5 MG tablet TK ONE T PO  "WITH EVENING MEAL AND ONE T FOUR  HOURS LATER AT BEDTIME  0   ? propranolol (INDERAL LA) 120 mg 24 hr capsule TK ONE C D  3   ? sildenafil (VIAGRA) 25 MG tablet Take 25 mg by mouth daily as needed for erectile dysfunction.     ? traZODone (DESYREL) 100 MG tablet 1 1/2   tab(s)     ? VRAYLAR 3 mg cap capsule TK ONE C PO QAM  6   ? divalproex (DEPAKOTE) 500 MG 24 hr tablet Take 2 tablets (1,000 mg total) by mouth bedtime. 60 tablet 0   ? divalproex (DEPAKOTE) 500 MG 24 hr tablet Take 1 tablet (500 mg total) by mouth daily. 30 tablet 0   ? hydrOXYzine (VISTARIL) 50 MG capsule Take 50 mg by mouth 4 (four) times a day as needed for anxiety.      ? NAPROXEN ORAL Take 500 mg by mouth 2 (two) times a day as needed.      ? omeprazole (PRILOSEC) 40 MG capsule Take 1 capsule (40 mg total) by mouth 2 (two) times a day before meals. 60 capsule 0   ? simethicone (MYLICON) 125 MG chewable tablet Chew 125 mg daily.        No facility-administered encounter medications on file as of 2/21/2018.        Allergies:  Chlorpromazine; Acetaminophen; Bupropion hcl; Fluphenazine hcl; Latuda [lurasidone]; Mellaril [thioridazine]; Nsaids (non-steroidal anti-inflammatory drug); Olanzapine; Risperidone; and Statins-hmg-coa reductase inhibitors    Review of Systems:     General: Weight Gain  Eyes: WNL  Ears/Nose/Throat: WNL  Lungs: WNL  Heart: Chest Pain  Stomach: Constipation  Bladder: WNL  Muscle/Joints: WNL  Skin: WNL  Nervous System: WNL  Mental Health: WNL     Blood: WNL    Objective:     Physical Exam:  Wt Readings from Last 5 Encounters:   02/21/18 187 lb 14.4 oz (85.2 kg)   06/07/16 192 lb 8 oz (87.3 kg)   06/02/16 200 lb (90.7 kg)   07/03/14 196 lb (88.9 kg)   06/25/14 196 lb (88.9 kg)      5' 10\" (1.778 m)  Body mass index is 26.96 kg/(m^2).  /66 (Patient Site: Left Arm, Patient Position: Sitting, Cuff Size: Adult Regular)  Pulse (!) 59  Resp 16  Ht 5' 10\" (1.778 m)  Wt 187 lb 14.4 oz (85.2 kg)  BMI 26.96 kg/m2    General " Appearance: Alert and not in distress   HEENT: No scleral icterus; the tongue is midline and the mucous membranes are pink and moist   Neck: No cervical bruits, adenopathy, or thyromegaly; jugular venous pressure is less than 5 cm   Chest: The spine is straight and the chest is symmetric   Lungs: Respirations are unlabored; the lungs are clear to auscultation   Cardiovasular: Auscultation reveals normal first and second heart sounds with no murmurs, rubs, or gallops; the carotid, radial, femoral, and posterior tibial pulses are intact   Abdomen: No organomegaly, masses, bruits, or tenderness; bowel sounds are present   Extremities: No cyanosis, clubbing or edema   Skin: No xanthelasma   Neurologic: Mood and affect are appropriate       Cardiac testing:    EKG: The electrocardiogram from the [facility] demonstrates sinus rhythm with possible prior septal infarction per my personal review.    Imaging:    No results found.    Lab Review:    Lab Results   Component Value Date     08/24/2015    K 4.4 08/24/2015     08/24/2015    CO2 22 08/24/2015    BUN 5 (L) 08/24/2015    CREATININE 1.15 08/24/2015    CALCIUM 10.1 08/24/2015     Lab Results   Component Value Date    WBC 9.4 06/07/2016    HGB 15.9 06/07/2016    HCT 47.7 06/07/2016    MCV 95 06/07/2016     06/07/2016     Lab Results   Component Value Date    CHOL 174 01/11/2018    TRIG 194 (H) 01/11/2018    HDL 50 01/11/2018    LDLDIRECT 64 01/20/2015     Creatinine (mg/dL)   Date Value   08/24/2015 1.15   01/20/2015 1.02   07/03/2014 1.20   06/19/2014 1.10     LDL Calculated (mg/dL)   Date Value   01/11/2018 85   01/23/2017 69   06/07/2016 76           Much or all of the text in this note was generated through the use of the Dragon Dictate voice-to-text software. Errors in spelling or words which seem out of context are unintentional. Sound alike errors, in particular, may have escaped editing.

## 2021-07-03 NOTE — ADDENDUM NOTE
Addendum Note by Edmund Long MD at 6/14/2018 12:32 PM     Author: Edmund Long MD Service: -- Author Type: Physician    Filed: 6/14/2018 12:32 PM Encounter Date: 6/14/2018 Status: Signed    : Edmund Long MD (Physician)    Addended by: EDMUND LONG on: 6/14/2018 12:32 PM        Modules accepted: Orders

## 2021-12-28 ENCOUNTER — LAB REQUISITION (OUTPATIENT)
Dept: LAB | Facility: CLINIC | Age: 61
End: 2021-12-28
Payer: MEDICARE

## 2021-12-28 DIAGNOSIS — K58.9 IRRITABLE BOWEL SYNDROME WITHOUT DIARRHEA: ICD-10-CM

## 2021-12-28 DIAGNOSIS — E78.2 MIXED HYPERLIPIDEMIA: ICD-10-CM

## 2021-12-28 DIAGNOSIS — Z12.5 ENCOUNTER FOR SCREENING FOR MALIGNANT NEOPLASM OF PROSTATE: ICD-10-CM

## 2021-12-28 LAB
ANION GAP SERPL CALCULATED.3IONS-SCNC: 11 MMOL/L (ref 5–18)
BUN SERPL-MCNC: 7 MG/DL (ref 8–22)
CALCIUM SERPL-MCNC: 9.6 MG/DL (ref 8.5–10.5)
CHLORIDE BLD-SCNC: 104 MMOL/L (ref 98–107)
CHOLEST SERPL-MCNC: 135 MG/DL
CO2 SERPL-SCNC: 23 MMOL/L (ref 22–31)
CREAT SERPL-MCNC: 0.93 MG/DL (ref 0.7–1.3)
FASTING STATUS PATIENT QL REPORTED: ABNORMAL
GFR SERPL CREATININE-BSD FRML MDRD: >90 ML/MIN/1.73M2
GLUCOSE BLD-MCNC: 107 MG/DL (ref 70–125)
HDLC SERPL-MCNC: 48 MG/DL
LDLC SERPL CALC-MCNC: 51 MG/DL
POTASSIUM BLD-SCNC: 4.4 MMOL/L (ref 3.5–5)
PSA SERPL-MCNC: 0.32 UG/L (ref 0–4.5)
SODIUM SERPL-SCNC: 138 MMOL/L (ref 136–145)
TRIGL SERPL-MCNC: 181 MG/DL

## 2021-12-28 PROCEDURE — 80061 LIPID PANEL: CPT | Mod: ORL | Performed by: FAMILY MEDICINE

## 2021-12-28 PROCEDURE — G0103 PSA SCREENING: HCPCS | Mod: ORL | Performed by: FAMILY MEDICINE

## 2021-12-28 PROCEDURE — 80048 BASIC METABOLIC PNL TOTAL CA: CPT | Mod: ORL | Performed by: FAMILY MEDICINE

## 2022-02-01 ENCOUNTER — LAB REQUISITION (OUTPATIENT)
Dept: LAB | Facility: CLINIC | Age: 62
End: 2022-02-01
Payer: MEDICARE

## 2022-02-01 DIAGNOSIS — R52 PAIN, UNSPECIFIED: ICD-10-CM

## 2022-02-01 PROCEDURE — U0005 INFEC AGEN DETEC AMPLI PROBE: HCPCS | Mod: ORL | Performed by: PHYSICIAN ASSISTANT

## 2022-02-02 LAB — SARS-COV-2 RNA RESP QL NAA+PROBE: NORMAL

## 2022-02-03 LAB — SARS-COV-2 RNA RESP QL NAA+PROBE: NOT DETECTED

## 2023-01-30 ENCOUNTER — LAB REQUISITION (OUTPATIENT)
Dept: LAB | Facility: CLINIC | Age: 63
End: 2023-01-30
Payer: MEDICARE

## 2023-01-30 DIAGNOSIS — Z00.00 ENCOUNTER FOR GENERAL ADULT MEDICAL EXAMINATION WITHOUT ABNORMAL FINDINGS: ICD-10-CM

## 2023-01-30 DIAGNOSIS — Z12.5 ENCOUNTER FOR SCREENING FOR MALIGNANT NEOPLASM OF PROSTATE: ICD-10-CM

## 2023-01-30 LAB
ANION GAP SERPL CALCULATED.3IONS-SCNC: 11 MMOL/L (ref 7–15)
BUN SERPL-MCNC: 7.1 MG/DL (ref 8–23)
CALCIUM SERPL-MCNC: 10.2 MG/DL (ref 8.8–10.2)
CHLORIDE SERPL-SCNC: 104 MMOL/L (ref 98–107)
CREAT SERPL-MCNC: 0.99 MG/DL (ref 0.67–1.17)
DEPRECATED HCO3 PLAS-SCNC: 26 MMOL/L (ref 22–29)
GFR SERPL CREATININE-BSD FRML MDRD: 85 ML/MIN/1.73M2
GLUCOSE SERPL-MCNC: 96 MG/DL (ref 70–99)
POTASSIUM SERPL-SCNC: 4.6 MMOL/L (ref 3.4–5.3)
PSA SERPL-MCNC: 0.34 NG/ML (ref 0–4.5)
SODIUM SERPL-SCNC: 141 MMOL/L (ref 136–145)

## 2023-01-30 PROCEDURE — G0103 PSA SCREENING: HCPCS | Mod: ORL | Performed by: FAMILY MEDICINE

## 2023-01-30 PROCEDURE — 80048 BASIC METABOLIC PNL TOTAL CA: CPT | Mod: ORL | Performed by: FAMILY MEDICINE

## 2023-04-22 ENCOUNTER — LAB REQUISITION (OUTPATIENT)
Dept: LAB | Facility: CLINIC | Age: 63
End: 2023-04-22
Payer: MEDICARE

## 2023-04-22 DIAGNOSIS — R19.7 DIARRHEA, UNSPECIFIED: ICD-10-CM

## 2023-04-22 LAB — C DIFF TOX B STL QL: NEGATIVE

## 2023-04-22 PROCEDURE — 87506 IADNA-DNA/RNA PROBE TQ 6-11: CPT | Mod: ORL | Performed by: FAMILY MEDICINE

## 2023-04-22 PROCEDURE — 87493 C DIFF AMPLIFIED PROBE: CPT | Mod: ORL | Performed by: FAMILY MEDICINE

## 2023-04-22 PROCEDURE — 87209 SMEAR COMPLEX STAIN: CPT | Mod: ORL | Performed by: FAMILY MEDICINE

## 2023-04-24 LAB — O+P STL MICRO: NEGATIVE

## 2023-06-09 ENCOUNTER — LAB REQUISITION (OUTPATIENT)
Dept: LAB | Facility: CLINIC | Age: 63
End: 2023-06-09
Payer: COMMERCIAL

## 2023-06-09 DIAGNOSIS — R19.7 DIARRHEA, UNSPECIFIED: ICD-10-CM

## 2023-06-19 ENCOUNTER — LAB REQUISITION (OUTPATIENT)
Dept: LAB | Facility: CLINIC | Age: 63
End: 2023-06-19
Payer: MEDICARE

## 2023-06-19 DIAGNOSIS — R19.7 DIARRHEA, UNSPECIFIED: ICD-10-CM

## 2023-06-19 PROCEDURE — 87328 CRYPTOSPORIDIUM AG IA: CPT | Mod: ORL | Performed by: FAMILY MEDICINE

## 2023-06-19 PROCEDURE — 87329 GIARDIA AG IA: CPT | Mod: ORL | Performed by: FAMILY MEDICINE

## 2023-06-20 LAB
C PARVUM AG STL QL IA: NEGATIVE
G LAMBLIA AG STL QL IA: NEGATIVE

## 2023-07-05 ENCOUNTER — LAB REQUISITION (OUTPATIENT)
Dept: LAB | Facility: CLINIC | Age: 63
End: 2023-07-05
Payer: MEDICARE

## 2023-07-05 DIAGNOSIS — M62.82 RHABDOMYOLYSIS: ICD-10-CM

## 2023-07-05 PROCEDURE — 82550 ASSAY OF CK (CPK): CPT | Mod: ORL | Performed by: FAMILY MEDICINE

## 2023-07-05 PROCEDURE — 80048 BASIC METABOLIC PNL TOTAL CA: CPT | Mod: ORL | Performed by: FAMILY MEDICINE

## 2023-07-06 LAB
ANION GAP SERPL CALCULATED.3IONS-SCNC: 11 MMOL/L (ref 7–15)
BUN SERPL-MCNC: 8.5 MG/DL (ref 8–23)
CALCIUM SERPL-MCNC: 9.4 MG/DL (ref 8.8–10.2)
CHLORIDE SERPL-SCNC: 109 MMOL/L (ref 98–107)
CK SERPL-CCNC: 3922 U/L (ref 39–308)
CREAT SERPL-MCNC: 0.83 MG/DL (ref 0.67–1.17)
DEPRECATED HCO3 PLAS-SCNC: 22 MMOL/L (ref 22–29)
GFR SERPL CREATININE-BSD FRML MDRD: >90 ML/MIN/1.73M2
GLUCOSE SERPL-MCNC: 97 MG/DL (ref 70–99)
POTASSIUM SERPL-SCNC: 3.7 MMOL/L (ref 3.4–5.3)
SODIUM SERPL-SCNC: 142 MMOL/L (ref 136–145)

## 2023-07-20 ENCOUNTER — LAB REQUISITION (OUTPATIENT)
Dept: LAB | Facility: CLINIC | Age: 63
End: 2023-07-20
Payer: MEDICARE

## 2023-07-20 DIAGNOSIS — M79.10 MYALGIA, UNSPECIFIED SITE: ICD-10-CM

## 2023-07-20 LAB
BASOPHILS # BLD AUTO: 0.1 10E3/UL (ref 0–0.2)
BASOPHILS NFR BLD AUTO: 1 %
EOSINOPHIL # BLD AUTO: 0.2 10E3/UL (ref 0–0.7)
EOSINOPHIL NFR BLD AUTO: 2 %
ERYTHROCYTE [DISTWIDTH] IN BLOOD BY AUTOMATED COUNT: 13.1 % (ref 10–15)
ERYTHROCYTE [SEDIMENTATION RATE] IN BLOOD BY WESTERGREN METHOD: 9 MM/HR (ref 0–20)
HCT VFR BLD AUTO: 40.1 % (ref 40–53)
HGB BLD-MCNC: 12.9 G/DL (ref 13.3–17.7)
IMM GRANULOCYTES # BLD: 0 10E3/UL
IMM GRANULOCYTES NFR BLD: 0 %
LYMPHOCYTES # BLD AUTO: 2.9 10E3/UL (ref 0.8–5.3)
LYMPHOCYTES NFR BLD AUTO: 35 %
MCH RBC QN AUTO: 31.2 PG (ref 26.5–33)
MCHC RBC AUTO-ENTMCNC: 32.2 G/DL (ref 31.5–36.5)
MCV RBC AUTO: 97 FL (ref 78–100)
MONOCYTES # BLD AUTO: 0.5 10E3/UL (ref 0–1.3)
MONOCYTES NFR BLD AUTO: 7 %
NEUTROPHILS # BLD AUTO: 4.5 10E3/UL (ref 1.6–8.3)
NEUTROPHILS NFR BLD AUTO: 55 %
NRBC # BLD AUTO: 0 10E3/UL
NRBC BLD AUTO-RTO: 0 /100
PLATELET # BLD AUTO: 320 10E3/UL (ref 150–450)
RBC # BLD AUTO: 4.14 10E6/UL (ref 4.4–5.9)
WBC # BLD AUTO: 8.1 10E3/UL (ref 4–11)

## 2023-07-20 PROCEDURE — 85652 RBC SED RATE AUTOMATED: CPT | Mod: ORL | Performed by: FAMILY MEDICINE

## 2023-07-20 PROCEDURE — 82550 ASSAY OF CK (CPK): CPT | Mod: ORL | Performed by: FAMILY MEDICINE

## 2023-07-20 PROCEDURE — 80053 COMPREHEN METABOLIC PANEL: CPT | Mod: ORL | Performed by: FAMILY MEDICINE

## 2023-07-20 PROCEDURE — 85025 COMPLETE CBC W/AUTO DIFF WBC: CPT | Mod: ORL | Performed by: FAMILY MEDICINE

## 2023-07-20 PROCEDURE — 86140 C-REACTIVE PROTEIN: CPT | Mod: ORL | Performed by: FAMILY MEDICINE

## 2023-07-20 PROCEDURE — 86038 ANTINUCLEAR ANTIBODIES: CPT | Mod: ORL | Performed by: FAMILY MEDICINE

## 2023-07-21 LAB
ALBUMIN SERPL BCG-MCNC: 4.1 G/DL (ref 3.5–5.2)
ALP SERPL-CCNC: 67 U/L (ref 40–129)
ALT SERPL W P-5'-P-CCNC: 48 U/L (ref 0–70)
ANA SER QL IF: NEGATIVE
ANION GAP SERPL CALCULATED.3IONS-SCNC: 14 MMOL/L (ref 7–15)
AST SERPL W P-5'-P-CCNC: 39 U/L (ref 0–45)
BILIRUB SERPL-MCNC: 0.2 MG/DL
BUN SERPL-MCNC: 7.7 MG/DL (ref 8–23)
CALCIUM SERPL-MCNC: 9.7 MG/DL (ref 8.8–10.2)
CHLORIDE SERPL-SCNC: 103 MMOL/L (ref 98–107)
CK SERPL-CCNC: 115 U/L (ref 39–308)
CREAT SERPL-MCNC: 0.88 MG/DL (ref 0.67–1.17)
CRP SERPL-MCNC: <3 MG/L
DEPRECATED HCO3 PLAS-SCNC: 23 MMOL/L (ref 22–29)
GFR SERPL CREATININE-BSD FRML MDRD: >90 ML/MIN/1.73M2
GLUCOSE SERPL-MCNC: 102 MG/DL (ref 70–99)
POTASSIUM SERPL-SCNC: 4.1 MMOL/L (ref 3.4–5.3)
PROT SERPL-MCNC: 6.4 G/DL (ref 6.4–8.3)
SODIUM SERPL-SCNC: 140 MMOL/L (ref 136–145)

## 2023-08-04 ENCOUNTER — LAB REQUISITION (OUTPATIENT)
Dept: LAB | Facility: CLINIC | Age: 63
End: 2023-08-04
Payer: MEDICARE

## 2023-08-04 ENCOUNTER — TRANSFERRED RECORDS (OUTPATIENT)
Dept: HEALTH INFORMATION MANAGEMENT | Facility: CLINIC | Age: 63
End: 2023-08-04

## 2023-08-04 DIAGNOSIS — M79.10 MYALGIA, UNSPECIFIED SITE: ICD-10-CM

## 2023-08-04 LAB
ALBUMIN SERPL BCG-MCNC: 4.4 G/DL (ref 3.5–5.2)
ALP SERPL-CCNC: 72 U/L (ref 40–129)
ALT SERPL W P-5'-P-CCNC: 25 U/L (ref 0–70)
ANION GAP SERPL CALCULATED.3IONS-SCNC: 11 MMOL/L (ref 7–15)
AST SERPL W P-5'-P-CCNC: 29 U/L (ref 0–45)
BASOPHILS # BLD AUTO: 0 10E3/UL (ref 0–0.2)
BASOPHILS NFR BLD AUTO: 1 %
BILIRUB SERPL-MCNC: 0.4 MG/DL
BUN SERPL-MCNC: 7.1 MG/DL (ref 8–23)
CALCIUM SERPL-MCNC: 9.6 MG/DL (ref 8.8–10.2)
CHLORIDE SERPL-SCNC: 106 MMOL/L (ref 98–107)
CK SERPL-CCNC: 202 U/L (ref 39–308)
CREAT SERPL-MCNC: 0.93 MG/DL (ref 0.67–1.17)
CRP SERPL-MCNC: 4.65 MG/L
DEPRECATED HCO3 PLAS-SCNC: 25 MMOL/L (ref 22–29)
EOSINOPHIL # BLD AUTO: 0.1 10E3/UL (ref 0–0.7)
EOSINOPHIL NFR BLD AUTO: 1 %
ERYTHROCYTE [DISTWIDTH] IN BLOOD BY AUTOMATED COUNT: 13 % (ref 10–15)
ERYTHROCYTE [SEDIMENTATION RATE] IN BLOOD BY WESTERGREN METHOD: 10 MM/HR (ref 0–20)
GFR SERPL CREATININE-BSD FRML MDRD: >90 ML/MIN/1.73M2
GLUCOSE SERPL-MCNC: 114 MG/DL (ref 70–99)
HCT VFR BLD AUTO: 41 % (ref 40–53)
HGB BLD-MCNC: 13.2 G/DL (ref 13.3–17.7)
IMM GRANULOCYTES # BLD: 0 10E3/UL
IMM GRANULOCYTES NFR BLD: 0 %
LYMPHOCYTES # BLD AUTO: 2 10E3/UL (ref 0.8–5.3)
LYMPHOCYTES NFR BLD AUTO: 26 %
MCH RBC QN AUTO: 31.1 PG (ref 26.5–33)
MCHC RBC AUTO-ENTMCNC: 32.2 G/DL (ref 31.5–36.5)
MCV RBC AUTO: 97 FL (ref 78–100)
MONOCYTES # BLD AUTO: 0.4 10E3/UL (ref 0–1.3)
MONOCYTES NFR BLD AUTO: 5 %
NEUTROPHILS # BLD AUTO: 5.1 10E3/UL (ref 1.6–8.3)
NEUTROPHILS NFR BLD AUTO: 67 %
NRBC # BLD AUTO: 0 10E3/UL
NRBC BLD AUTO-RTO: 0 /100
PLATELET # BLD AUTO: 242 10E3/UL (ref 150–450)
POTASSIUM SERPL-SCNC: 4.2 MMOL/L (ref 3.4–5.3)
PROT SERPL-MCNC: 6.7 G/DL (ref 6.4–8.3)
RBC # BLD AUTO: 4.25 10E6/UL (ref 4.4–5.9)
SODIUM SERPL-SCNC: 142 MMOL/L (ref 136–145)
WBC # BLD AUTO: 7.6 10E3/UL (ref 4–11)

## 2023-08-04 PROCEDURE — 80053 COMPREHEN METABOLIC PANEL: CPT | Mod: ORL | Performed by: FAMILY MEDICINE

## 2023-08-04 PROCEDURE — 85025 COMPLETE CBC W/AUTO DIFF WBC: CPT | Mod: ORL | Performed by: FAMILY MEDICINE

## 2023-08-04 PROCEDURE — 86038 ANTINUCLEAR ANTIBODIES: CPT | Mod: ORL | Performed by: FAMILY MEDICINE

## 2023-08-04 PROCEDURE — 85652 RBC SED RATE AUTOMATED: CPT | Mod: ORL | Performed by: FAMILY MEDICINE

## 2023-08-04 PROCEDURE — 86140 C-REACTIVE PROTEIN: CPT | Mod: ORL | Performed by: FAMILY MEDICINE

## 2023-08-04 PROCEDURE — 82550 ASSAY OF CK (CPK): CPT | Mod: ORL | Performed by: FAMILY MEDICINE

## 2023-08-07 LAB — ANA SER QL IF: NEGATIVE

## 2023-10-25 ENCOUNTER — TRANSFERRED RECORDS (OUTPATIENT)
Dept: HEALTH INFORMATION MANAGEMENT | Facility: CLINIC | Age: 63
End: 2023-10-25

## 2023-11-17 ENCOUNTER — TRANSCRIBE ORDERS (OUTPATIENT)
Dept: OTHER | Age: 63
End: 2023-11-17

## 2023-11-17 DIAGNOSIS — R25.1 TREMOR: Primary | ICD-10-CM

## 2023-11-20 NOTE — TELEPHONE ENCOUNTER
Action 11/20/23 MV 1.19pm   Action Taken 1) records request faxed to [facility]  2) imaging request faxed to [facility]     Action 12/11/23 MV 12.43pm   Action Taken Images resolved in PACS ; 2nd request faxed to [facility]     Action 12/14/23 MV 8.48am   Action Taken Records received and sent to scanning       RECORDS RECEIVED FROM: external   REASON FOR VISIT: tremors   Date of Appt: 1/22/24   NOTES (FOR ALL VISITS) STATUS DETAILS   OFFICE NOTE from referring provider Received Dr Megan Espinoza @ [facility]:  10/25/23   DISCHARGE SUMMARY from hospital Care Everywhere [facility]:  7/1/23-7/4/23   MEDICATION LIST Care Everywhere    IMAGING  (FOR ALL VISITS)     MRI (HEAD, NECK, SPINE) PACS [facility]:  MRI Brain 7/1/23   CT (HEAD, NECK, SPINE) PACS [facility]:  CTA Head Neck 7/1/23  CT Cervical Spine 4/16/23  CT Head 4/16/23

## 2024-01-22 ENCOUNTER — PRE VISIT (OUTPATIENT)
Dept: NEUROLOGY | Facility: CLINIC | Age: 64
End: 2024-01-22

## 2024-01-31 ENCOUNTER — LAB REQUISITION (OUTPATIENT)
Dept: LAB | Facility: CLINIC | Age: 64
End: 2024-01-31

## 2024-01-31 DIAGNOSIS — Z12.5 ENCOUNTER FOR SCREENING FOR MALIGNANT NEOPLASM OF PROSTATE: ICD-10-CM

## 2024-01-31 PROCEDURE — G0103 PSA SCREENING: HCPCS | Performed by: FAMILY MEDICINE

## 2024-02-01 LAB — PSA SERPL DL<=0.01 NG/ML-MCNC: 0.3 NG/ML (ref 0–4.5)

## 2024-02-01 NOTE — PROGRESS NOTES
Department of Neurology  Movement Disorders Division   New Patient Visit    Patient: Patricio Khan   MRN: [id number]   : 1960   Date of Visit: 2024    CC: tremor    HPI:  Patricio Khan is a 63 year old male with a history of chronic pain, HTN, schizoaffective disorder, who presents as a new movement disorders consult for tremor.    Patricio is accompanied by his  Kd.    Patricio notes a tremor primarily in his L hand, occasionally in R hand for the past 6mo or so. He describes it primarily as a resting tremor that occurs when he is relaxing. He has noticed it also during some activities such as holding a book. It can come and go, though it has been much more calm recently. The intensity of the tremor can vary and tends to be primarily in the distal hand and forearm. When Patricio notices it, he can typically calm the tremor down via relaxation; no decrease in tremor with activity. Denies head, jaw, voice, or leg tremors.     Attends AA meetings regularly. Has appeared during meetings and was brought to his attention by his mental health prescriber, who recommended a neurology referral.     He feels the tremor has decreased in frequency and intensity in the past couple of months. The only lifestyle change he can identify is a recent increase in exercise.     Walking is good, no balance changes.     Sleep is poor, feels bothered by dry mouth that wakes him up. No dream enactment behaviors. Tends to fall asleep and wake up early (7pm-3am).    Endorses some chronic constipation that is well-controlled with fiber and stool softeners. Denies urinary issues.     Mood is good, well-controlled. Recently stopped Vraylar as it may have been contributing to dry mouth. Has been on Rexulti (brexpiprazole) for 4-5y, has been on multiple other anti-psychotics over the years. Is also on iloperidone (Fanapt).    Lives with his  in [place]. He is Kd's primary caretaker.         ROS:  All others negative  except as listed above.    Past Medical History:   Diagnosis Date    Alcohol use disorder, severe, in sustained remission (H)     Essential hypertension     Hepatitis C     Treated    Schizoaffective disorder, depressive type (H)         Past Surgical History:   Procedure Laterality Date    WISDOM TOOTH EXTRACTION      3 oral surgeries        Current Outpatient Medications   Medication Sig Dispense Refill    baclofen (LIORESAL) 20 MG tablet [BACLOFEN (LIORESAL) 20 MG TABLET] TK 2 TS PO QAM AND QHS  6    brexpiprazole (REXULTI) 4 MG tablet Take 4 mg by mouth daily      busPIRone (BUSPAR) 30 MG tablet [BUSPIRONE (BUSPAR) 30 MG TABLET] 1 tab(s)      clonazePAM (KLONOPIN) 0.5 MG tablet 0.5 mg nightly as needed for anxiety      dicyclomine (BENTYL) 20 mg tablet [DICYCLOMINE (BENTYL) 20 MG TABLET] Take 20 mg by mouth 2 (two) times a day.      esomeprazole (NEXIUM) 20 MG DR capsule Take 20 mg by mouth 2 times daily      FANAPT 4 MG TABS tablet Take 12 mg by mouth daily      multivitamin with minerals (THERA-M) 9 mg iron-400 mcg Tab tablet [MULTIVITAMIN WITH MINERALS (THERA-M) 9 MG IRON-400 MCG TAB TABLET] Take 1 tablet by mouth daily.      omeprazole (PRILOSEC) 40 MG capsule [OMEPRAZOLE (PRILOSEC) 40 MG CAPSULE] Take 1 capsule (40 mg total) by mouth 2 (two) times a day before meals. 60 capsule 0    psyllium with dextrose (METAMUCIL JAR) powder [PSYLLIUM WITH DEXTROSE (METAMUCIL JAR) POWDER] Take by mouth 3 (three) times a day.      sildenafil (VIAGRA) 25 MG tablet [SILDENAFIL (VIAGRA) 25 MG TABLET] Take 25 mg by mouth daily as needed for erectile dysfunction.         Allergies   Allergen Reactions    Chlorpromazine Shortness Of Breath    Acetaminophen Nausea    Bupropion Hcl [Bupropion] Unknown     Unable to urinate    Fluphenazine Hcl [Fluphenazine] Unknown     Unable to urinate    Latuda [Lurasidone] Unknown     EPSE , Primary MD directed pt. to avoid     Mellaril [Thioridazine] Other (See Comments)     Difficulty  breathing and Unable to urinate    Nsaids (Non-Steroidal Anti-Inflammatory Drug) [Nsaids] Other (See Comments)     Primary md directed patient to avoid    Olanzapine Unknown     Unable to urinate    Risperidone Unknown     Unable to urinate    Statins-Hmg-Coa Reductase Inhibitors [Statins] Unknown     unknown        Family History   Problem Relation Age of Onset    No Known Problems Mother     Colon Cancer Father         mets to liver    No Known Problems Brother     No Known Problems Sister     No Known Problems Sister         Social History     Socioeconomic History    Marital status:      Spouse name: Not on file    Number of children: 0    Years of education: Not on file    Highest education level: Not on file   Occupational History    Not on file   Tobacco Use    Smoking status: Former     Packs/day: 1.00     Years: 40.00     Additional pack years: 0.00     Total pack years: 40.00     Types: Cigarettes     Quit date: 2015     Years since quittin.9    Smokeless tobacco: Never   Substance and Sexual Activity    Alcohol use: No     Alcohol/week: 14.0 standard drinks of alcohol     Comment: Alcoholic Drinks/day: states no longer drink alcohol    Drug use: No     Comment: Drug use: takes stimulants for ADD    Sexual activity: Yes     Partners: Male   Other Topics Concern    Not on file   Social History Narrative    Lives with his , Kd.  On SSDI, mental illness.  Previously worked as a .  Kd is on disability secondary to a stroke.      Social Determinants of Health     Financial Resource Strain: Not on file   Food Insecurity: Not on file   Transportation Needs: Not on file   Physical Activity: Not on file   Stress: Not on file   Social Connections: Not on file   Interpersonal Safety: Not on file   Housing Stability: Not on file        PHYSICAL EXAM:  /55 (BP Location: Right arm, Patient Position: Sitting)   Pulse 62     Mental Status:   Alert and oriented to  person, place, time, and situation.  Speech fluent without paraphasic errors. Follows commands briskly.     CN:  II: Pupils equal, round, and reactive to light. VFF.  III, IV, VI: EOM normal range, no nystagmus.  Normal saccades.  V:  Facial sensation intact.  VII: Face symmetric at rest and with activation  VIII: Intact to finger rub bilaterally.  IX, X: Palate rise b/l, uvula midline.  No dysarthria.  XI: Trapezius and SCM 5/5 bilaterally.  XII: Tongue protrudes midline. No fasciculation or atrophy noted.    Motor:    One instance of mild resting tremor in the L hand. No postural component, some mild kinetic tremor in the L hand.   No rigidity, mild bradykinesia in bilateral feet.   Some wasting in the dorsal hand above anatomical snuff box.  R/L  Shoulder abd      5/5  Elbow flex  5/5  Elbow ext  5/5  Wrist flex  5/5  Wrist ext  5/5    Hip flex  5/5  Knee flex  5/5  Knee ext  5/5  Dorsiflex  5/5  Plantar flex  5/5    Reflexes:  R/L  Biceps   2+/2+  Triceps  2+/2+  Patellar  2+/2+  Achilles  2+/2+    Sensation:    Intact to LT, vibration in all extremities.    Coordination:    FTN and HTS intact bilaterally.    Gait:    Slight decrease in R arm swing        LABS:   Latest Reference Range & Units 08/04/23 09:51   Sodium 136 - 145 mmol/L 142   Potassium 3.4 - 5.3 mmol/L 4.2   Chloride 98 - 107 mmol/L 106   Carbon Dioxide (CO2) 22 - 29 mmol/L 25   Urea Nitrogen 8.0 - 23.0 mg/dL 7.1 (L)   Creatinine 0.67 - 1.17 mg/dL 0.93   GFR Estimate >60 mL/min/1.73m2 >90   Calcium 8.8 - 10.2 mg/dL 9.6   Anion Gap 7 - 15 mmol/L 11   Albumin 3.5 - 5.2 g/dL 4.4   Protein Total 6.4 - 8.3 g/dL 6.7   Alkaline Phosphatase 40 - 129 U/L 72   ALT 0 - 70 U/L 25   AST 0 - 45 U/L 29   Bilirubin Total <=1.2 mg/dL 0.4   CK Total 39 - 308 U/L 202   CRP Inflammation <5.00 mg/L 4.65   (L): Data is abnormally low  IMAGING:  MRI brain w/ contrast 7/1/23  IMPRESSION:   1. No acute or subacute ischemic change.   2.  No acute intracranial pathology or  abnormal enhancement.   3.  Mild nonspecific white matter T2/FLAIR signal abnormality and brain parenchymal volume loss.   4.  Nonspecific edema and enhancement of the left frontal scalp, recommend clinical correlation.       ASSESSMENT/PLAN:  Patricio Khan is a 63 year old male with a history of chronic pain, HTN, schizoaffective disorder, who presents as a new movement disorders consult for tremor.    Pt presents with an intermittent resting tremor that appears to have spontaneously improved, perhaps due to increased exercise and sleep from less dry mouth. The Vraylar may also have been actively worsening tremor.    On exam, he has minimal evidence of parkinsonism and no concerns for other neurologic diseases or degenerative processes outside of a possible L carpal tunnel syndrome (he is not bothered by this and not interested in evaluating).     At this time, tremor is mild and he is not functionally limited by his symptoms. There is little utility in performing additional diagnostics such as a Juan Carlos Scan as it would not . We would not recommend medical treatment of tremor at this time and Patricio is in agreement with this. He is at risk for developing tardive parkinsonism due to anti-dopaminergic medication exposure, so he was counseled to continue monitoring for the emergence of new or worsening symptoms. This also does not preclude him from eventually manifesting signs of linda Parkinson's disease, but this will need to be evaluated over time.    Return to clinic as needed    Bassem Mao MD  Fellow  Movement Disorders Division  [facility]      Patient seen and discussed with attending neurologist Dr. Kaila Camacho

## 2024-02-02 ENCOUNTER — OFFICE VISIT (OUTPATIENT)
Dept: NEUROLOGY | Facility: CLINIC | Age: 64
End: 2024-02-02
Attending: FAMILY MEDICINE
Payer: COMMERCIAL

## 2024-02-02 VITALS — SYSTOLIC BLOOD PRESSURE: 106 MMHG | HEART RATE: 62 BPM | DIASTOLIC BLOOD PRESSURE: 55 MMHG

## 2024-02-02 DIAGNOSIS — R25.1 TREMOR: Primary | ICD-10-CM

## 2024-02-02 DIAGNOSIS — G25.2 RESTING TREMOR: ICD-10-CM

## 2024-02-02 PROCEDURE — 99204 OFFICE O/P NEW MOD 45 MIN: CPT | Performed by: INTERNAL MEDICINE

## 2024-02-02 RX ORDER — ILOPERIDONE 4 MG/1
12 TABLET ORAL DAILY
COMMUNITY
Start: 2023-06-08

## 2024-02-02 RX ORDER — CLONAZEPAM 0.5 MG/1
0.5 TABLET ORAL
COMMUNITY
Start: 2023-11-08

## 2024-02-02 NOTE — PATIENT INSTRUCTIONS
You have a mild resting tremor that may be from the long-term effects of psychiatric medications. It seems to have gotten better, perhaps from the combination of increasing exercise and stopping Vraylar.     It makes sense to continue watching things for now and we can always re-assess if your symptoms worsen or change.     You may notice that the tremor comes back or gets worse during periods of increased stress, anxiety, or poor sleep.

## 2024-02-02 NOTE — NURSING NOTE
Chief Complaint   Patient presents with    Consult      Tremor,  Referred by Megan Espinoza MD Kena Gemeda, MA on 2/2/2024 at 8:39 AM

## 2025-01-09 ENCOUNTER — LAB REQUISITION (OUTPATIENT)
Dept: LAB | Facility: CLINIC | Age: 65
End: 2025-01-09

## 2025-01-09 DIAGNOSIS — R10.9 UNSPECIFIED ABDOMINAL PAIN: ICD-10-CM

## 2025-01-09 LAB
ALBUMIN SERPL BCG-MCNC: 4.3 G/DL (ref 3.5–5.2)
ALP SERPL-CCNC: 83 U/L (ref 40–150)
ALT SERPL W P-5'-P-CCNC: 26 U/L (ref 0–70)
ANION GAP SERPL CALCULATED.3IONS-SCNC: 12 MMOL/L (ref 7–15)
AST SERPL W P-5'-P-CCNC: 28 U/L (ref 0–45)
BILIRUB SERPL-MCNC: 0.4 MG/DL
BUN SERPL-MCNC: 8.6 MG/DL (ref 8–23)
CALCIUM SERPL-MCNC: 9.7 MG/DL (ref 8.8–10.4)
CHLORIDE SERPL-SCNC: 100 MMOL/L (ref 98–107)
CK SERPL-CCNC: 253 U/L (ref 39–308)
CREAT SERPL-MCNC: 0.94 MG/DL (ref 0.67–1.17)
CRP SERPL-MCNC: 3.41 MG/L
EGFRCR SERPLBLD CKD-EPI 2021: 89 ML/MIN/1.73M2
ERYTHROCYTE [DISTWIDTH] IN BLOOD BY AUTOMATED COUNT: 12.9 % (ref 10–15)
ERYTHROCYTE [SEDIMENTATION RATE] IN BLOOD BY WESTERGREN METHOD: 10 MM/HR (ref 0–20)
GLUCOSE SERPL-MCNC: 130 MG/DL (ref 70–99)
HCO3 SERPL-SCNC: 24 MMOL/L (ref 22–29)
HCT VFR BLD AUTO: 38.6 % (ref 40–53)
HGB BLD-MCNC: 12.6 G/DL (ref 13.3–17.7)
MCH RBC QN AUTO: 31.1 PG (ref 26.5–33)
MCHC RBC AUTO-ENTMCNC: 32.6 G/DL (ref 31.5–36.5)
MCV RBC AUTO: 95 FL (ref 78–100)
PLATELET # BLD AUTO: 281 10E3/UL (ref 150–450)
POTASSIUM SERPL-SCNC: 3.9 MMOL/L (ref 3.4–5.3)
PROT SERPL-MCNC: 7 G/DL (ref 6.4–8.3)
RBC # BLD AUTO: 4.05 10E6/UL (ref 4.4–5.9)
SODIUM SERPL-SCNC: 136 MMOL/L (ref 135–145)
WBC # BLD AUTO: 6.6 10E3/UL (ref 4–11)

## 2025-01-09 PROCEDURE — 85652 RBC SED RATE AUTOMATED: CPT | Performed by: FAMILY MEDICINE

## 2025-01-09 PROCEDURE — 82040 ASSAY OF SERUM ALBUMIN: CPT | Performed by: FAMILY MEDICINE

## 2025-01-09 PROCEDURE — 82435 ASSAY OF BLOOD CHLORIDE: CPT | Performed by: FAMILY MEDICINE

## 2025-01-09 PROCEDURE — 85041 AUTOMATED RBC COUNT: CPT | Performed by: FAMILY MEDICINE

## 2025-01-09 PROCEDURE — 85018 HEMOGLOBIN: CPT | Performed by: FAMILY MEDICINE

## 2025-01-09 PROCEDURE — 82550 ASSAY OF CK (CPK): CPT | Performed by: FAMILY MEDICINE

## 2025-01-09 PROCEDURE — 86140 C-REACTIVE PROTEIN: CPT | Performed by: FAMILY MEDICINE

## 2025-05-21 ENCOUNTER — LAB REQUISITION (OUTPATIENT)
Dept: LAB | Facility: CLINIC | Age: 65
End: 2025-05-21

## 2025-05-21 DIAGNOSIS — Z01.818 ENCOUNTER FOR OTHER PREPROCEDURAL EXAMINATION: ICD-10-CM

## 2025-05-21 PROCEDURE — 80048 BASIC METABOLIC PNL TOTAL CA: CPT | Performed by: FAMILY MEDICINE

## 2025-05-22 LAB
ANION GAP SERPL CALCULATED.3IONS-SCNC: 12 MMOL/L (ref 7–15)
BUN SERPL-MCNC: 9.6 MG/DL (ref 8–23)
CALCIUM SERPL-MCNC: 9.6 MG/DL (ref 8.8–10.4)
CHLORIDE SERPL-SCNC: 105 MMOL/L (ref 98–107)
CREAT SERPL-MCNC: 0.92 MG/DL (ref 0.67–1.17)
EGFRCR SERPLBLD CKD-EPI 2021: >90 ML/MIN/1.73M2
GLUCOSE SERPL-MCNC: 114 MG/DL (ref 70–99)
HCO3 SERPL-SCNC: 24 MMOL/L (ref 22–29)
POTASSIUM SERPL-SCNC: 4.5 MMOL/L (ref 3.4–5.3)
SODIUM SERPL-SCNC: 141 MMOL/L (ref 135–145)

## 2025-07-02 ENCOUNTER — LAB REQUISITION (OUTPATIENT)
Dept: LAB | Facility: CLINIC | Age: 65
End: 2025-07-02

## 2025-07-02 DIAGNOSIS — R10.9 UNSPECIFIED ABDOMINAL PAIN: ICD-10-CM

## 2025-07-02 PROCEDURE — 83690 ASSAY OF LIPASE: CPT | Performed by: FAMILY MEDICINE

## 2025-07-03 LAB — LIPASE SERPL-CCNC: 74 U/L (ref 13–60)

## 2025-08-26 ENCOUNTER — LAB REQUISITION (OUTPATIENT)
Dept: LAB | Facility: CLINIC | Age: 65
End: 2025-08-26

## 2025-08-26 ENCOUNTER — TELEPHONE (OUTPATIENT)
Dept: PHARMACY | Facility: OTHER | Age: 65
End: 2025-08-26
Payer: COMMERCIAL

## 2025-08-26 DIAGNOSIS — Z12.5 ENCOUNTER FOR SCREENING FOR MALIGNANT NEOPLASM OF PROSTATE: ICD-10-CM

## 2025-08-26 PROCEDURE — G0103 PSA SCREENING: HCPCS | Performed by: FAMILY MEDICINE

## 2025-08-26 PROCEDURE — 82465 ASSAY BLD/SERUM CHOLESTEROL: CPT | Performed by: FAMILY MEDICINE

## 2025-08-27 LAB
CHOLEST SERPL-MCNC: 116 MG/DL
FASTING STATUS PATIENT QL REPORTED: NORMAL
HDLC SERPL-MCNC: 53 MG/DL
LDLC SERPL CALC-MCNC: 36 MG/DL
NONHDLC SERPL-MCNC: 63 MG/DL
PSA SERPL DL<=0.01 NG/ML-MCNC: 0.23 NG/ML (ref 0–4.5)
TRIGL SERPL-MCNC: 134 MG/DL